# Patient Record
Sex: MALE | Race: WHITE
[De-identification: names, ages, dates, MRNs, and addresses within clinical notes are randomized per-mention and may not be internally consistent; named-entity substitution may affect disease eponyms.]

---

## 2018-10-16 ENCOUNTER — HOSPITAL ENCOUNTER (OUTPATIENT)
Dept: HOSPITAL 89 - OR | Age: 48
Discharge: HOME | End: 2018-10-16
Attending: ORTHOPAEDIC SURGERY
Payer: COMMERCIAL

## 2018-10-16 VITALS — DIASTOLIC BLOOD PRESSURE: 77 MMHG | SYSTOLIC BLOOD PRESSURE: 115 MMHG

## 2018-10-16 VITALS — DIASTOLIC BLOOD PRESSURE: 87 MMHG | SYSTOLIC BLOOD PRESSURE: 126 MMHG

## 2018-10-16 VITALS — DIASTOLIC BLOOD PRESSURE: 78 MMHG | SYSTOLIC BLOOD PRESSURE: 125 MMHG

## 2018-10-16 VITALS — SYSTOLIC BLOOD PRESSURE: 121 MMHG | DIASTOLIC BLOOD PRESSURE: 73 MMHG

## 2018-10-16 VITALS — DIASTOLIC BLOOD PRESSURE: 81 MMHG | SYSTOLIC BLOOD PRESSURE: 110 MMHG

## 2018-10-16 VITALS — BODY MASS INDEX: 29.33 KG/M2 | HEIGHT: 69 IN | WEIGHT: 198 LBS

## 2018-10-16 VITALS — SYSTOLIC BLOOD PRESSURE: 101 MMHG | DIASTOLIC BLOOD PRESSURE: 60 MMHG

## 2018-10-16 VITALS — SYSTOLIC BLOOD PRESSURE: 115 MMHG | DIASTOLIC BLOOD PRESSURE: 41 MMHG

## 2018-10-16 VITALS — SYSTOLIC BLOOD PRESSURE: 104 MMHG | DIASTOLIC BLOOD PRESSURE: 49 MMHG

## 2018-10-16 VITALS — DIASTOLIC BLOOD PRESSURE: 78 MMHG | SYSTOLIC BLOOD PRESSURE: 119 MMHG

## 2018-10-16 VITALS — DIASTOLIC BLOOD PRESSURE: 97 MMHG | SYSTOLIC BLOOD PRESSURE: 131 MMHG

## 2018-10-16 VITALS — DIASTOLIC BLOOD PRESSURE: 66 MMHG | SYSTOLIC BLOOD PRESSURE: 101 MMHG

## 2018-10-16 DIAGNOSIS — S46.311A: ICD-10-CM

## 2018-10-16 DIAGNOSIS — M70.21: ICD-10-CM

## 2018-10-16 DIAGNOSIS — M77.9: Primary | ICD-10-CM

## 2018-10-16 PROCEDURE — 24105 EXCISION OLECRANON BURSA: CPT

## 2018-10-16 PROCEDURE — 24120 EXC/CRTG B1 CST/B9 TUM RDS: CPT

## 2018-10-16 PROCEDURE — 94667 MNPJ CHEST WALL 1ST: CPT

## 2018-10-16 NOTE — OPERATIVE REPORT 1
EVENT DATE: October 16, 2018 

SURGEON: Aristeo Jose MD 

ANESTHESIOLOGIST: Osmin Dudley MD 

ANESTHESIA: General, LMA 

ASSISTANT: JULITA Moreno 





PREOPERATIVE DIAGNOSIS  

Right elbow bone spur and well as olecranon bursitis and a medial triceps tear. 





POSTOPERATIVE DIAGNOSIS 

Right elbow bone spur and well as olecranon bursitis and a medial triceps tear. 





PROCEDURE PERFORMED 

1.  Triceps reattachment of the distal triceps on the medial side. 

2.  Bone spur, olecranon osteophyte removal which was quite large. 

3.  Olecranon bursectomy.  



FINDINGS

The patient had a significant amount of irritation associated with the bone spur

which had torn the triceps and also caused an olecranon bursitis.  



ESTIMATED BLOOD LOSS 

Minimal. 



DRAINS

None. 



COMPLICATIONS 

None. 



IMPLANTS USED 

FiberWire stitch which was a 2-0 FiberWire.  



SPECIMENS 

None. 



TOURNIQUET TIME 

About 35 minutes.  



INDICATIONS AND HISTORY 

This patient is a 48-year-old male who presented to my clinic for evaluation of 

right elbow pain and irritation and a large bursitis.  We had found him to have 

a large bone spur within the olecranon and also a tearing within the triceps on 

an MRI, so we talked to him about the implications of this since it was 

continuing to give him trouble, issues, and irritation associated with it, so he

wanted to go ahead with a triceps reattachment, bone spur removal, and olecranon

bursectomy today 10/16/18.  The risk and benefits were discussed with the 

patient and informed consent was obtained at the last clinic visit and we talked

about how it may recur and may continue to give him problems and issues long-

term.  



DESCRIPTION OF PROCEDURE 

As the patient was brought in the operating room, he and the procedure were both

verified.  He was placed supine on the operating table and induced and intubated

by anesthesia.  The right upper extremity was then prepped and draped in the 

usual fashion and a timeout was observed verifying the correct patient and 

procedure.  



The standard incision was made over the elbow with a site cleared to the lateral

side on the middle portion of the olecranon.  In the standard after inflation of

the tourniquet, I was able to go thorough the skin and subcutaneous tissue and 

then I was able to resect the bursa without any major difficulty using sharp and

blunt dissection around the entirety of the bursa itself in order to protect the

medial sided structures.  Once I removed the bursa, I then was able to identify 

the large bone spur and remove the lose fragment from the posterior aspect and 

then also was able to remove the bone spur using a osteotome to take it off the 

proximal aspect of the olecranon itself.  We then took C-Arm images to verify 

that there was no further signs of issues and it was smoothed off after using a 

rasp.  



I then was able to get into the medial side of the triceps where there was a 

tear.  I was able to cut away the torn and irritated portion and then make it 

amenable for repair.  I did this by doing two drill holes using a 2-0 drill bit 

in a cross-path fashion in order to pass the sutures around the olecranon itself

and then back up towards the triceps. I then was able to pass them and run them 

in a running type fashion up the triceps in order to repair the triceps back to 

itself with two independent limbs which were then tied together at the end.  

Once this was done, there was no signs of problems with the triceps with flexion

or extension.  It had good range of motion associated with it and there were 

also no issues associated with the elbow itself with any type of instability. We

then irrigated with saline which we had throughout the entire case and then I 

was able to close the subcutaneous with 3-0 Vicryl.  This was then followed by 

4-0 Monocryl on the skin and then it was dressed with Steri-Strips, gauze 4 x 

4's, and a compressive wrap and then a posterior splint was applied.  The 

patient was then awakened and extubated and transferred to the PACU in stable 

condition.  

FRAN